# Patient Record
Sex: MALE | Race: BLACK OR AFRICAN AMERICAN | NOT HISPANIC OR LATINO | URBAN - METROPOLITAN AREA
[De-identification: names, ages, dates, MRNs, and addresses within clinical notes are randomized per-mention and may not be internally consistent; named-entity substitution may affect disease eponyms.]

---
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
## 2025-03-01 NOTE — PROCEDURE: TREATMENT REGIMEN
Initiate Treatment: Wash face with cerave hydrating wash
Samples Given: Cerave cream, and hydrating wash
Continue Regimen: Clindamycin lotion to face bid
Detail Level: Zone
Modify Regimen: Triamcinolone 0.1% ointment to hands bid prn for flares
Calm
Samples Given: Cerave cream and coupon
Continue Regimen: Moisturizer cream to  hands two to three times daily